# Patient Record
Sex: FEMALE | NOT HISPANIC OR LATINO | URBAN - METROPOLITAN AREA
[De-identification: names, ages, dates, MRNs, and addresses within clinical notes are randomized per-mention and may not be internally consistent; named-entity substitution may affect disease eponyms.]

---

## 2019-10-11 ENCOUNTER — TELEPHONE (OUTPATIENT)
Dept: ORTHOPEDICS | Facility: CLINIC | Age: 54
End: 2019-10-11

## 2019-10-11 NOTE — TELEPHONE ENCOUNTER
Called patient in regard to referral about index finger fx. Left a detailed message along with a call back number so we could get her set up at her convenience to see Dr. Littlejohn.

## 2019-10-30 ENCOUNTER — TELEPHONE (OUTPATIENT)
Dept: ORTHOPEDICS | Facility: CLINIC | Age: 54
End: 2019-10-30

## 2019-10-30 NOTE — TELEPHONE ENCOUNTER
Called patient in regard to appt on 10/31/19 with Dr. Littlejohn. Left a detailed message explaining that we need XR prior to her appt-left a call back number so we could discuss.

## 2023-10-09 ENCOUNTER — TELEPHONE (OUTPATIENT)
Dept: ORTHOPEDICS | Facility: CLINIC | Age: 58
End: 2023-10-09
Payer: MEDICAID

## 2023-10-09 NOTE — TELEPHONE ENCOUNTER
----- Message from Prakash Mcdonough sent at 10/9/2023 11:00 AM CDT -----  Regarding: appt  Contact: @[phone]  Pt calling needing appt asap for left wrist pain was told by ED to get seen asap ... Has disc with images ...... Has [place] medicaid Pls call and adv@[phone]

## 2023-10-09 NOTE — TELEPHONE ENCOUNTER
Attempted to call patient. No answered. Pt schedule in [place] 12/8. [facility] doesn't have any appointment avalable until 2/2024.